# Patient Record
Sex: FEMALE | Race: WHITE | NOT HISPANIC OR LATINO | Employment: OTHER | URBAN - METROPOLITAN AREA
[De-identification: names, ages, dates, MRNs, and addresses within clinical notes are randomized per-mention and may not be internally consistent; named-entity substitution may affect disease eponyms.]

---

## 2017-05-01 ENCOUNTER — HOSPITAL ENCOUNTER (OUTPATIENT)
Dept: RADIOLOGY | Facility: MEDICAL CENTER | Age: 72
End: 2017-05-01
Attending: INTERNAL MEDICINE
Payer: MEDICARE

## 2017-05-01 ENCOUNTER — HOSPITAL ENCOUNTER (OUTPATIENT)
Dept: RADIOLOGY | Facility: MEDICAL CENTER | Age: 72
End: 2017-05-01
Attending: NURSE PRACTITIONER
Payer: MEDICARE

## 2017-05-01 ENCOUNTER — HOSPITAL ENCOUNTER (OUTPATIENT)
Dept: CARDIOLOGY | Facility: MEDICAL CENTER | Age: 72
End: 2017-05-01
Attending: INTERNAL MEDICINE | Admitting: RADIOLOGY
Payer: MEDICARE

## 2017-05-01 DIAGNOSIS — R01.1 MURMUR: ICD-10-CM

## 2017-05-01 DIAGNOSIS — R07.9 CHEST PAIN, UNSPECIFIED: ICD-10-CM

## 2017-05-01 DIAGNOSIS — R94.31 NONSPECIFIC ABNORMAL ELECTROCARDIOGRAM (ECG) (EKG): ICD-10-CM

## 2017-05-01 LAB
LV EJECT FRACT  99904: 65
LV EJECT FRACT MOD 2C 99903: 58.92
LV EJECT FRACT MOD 4C 99902: 65.89
LV EJECT FRACT MOD BP 99901: 67.82

## 2017-05-01 PROCEDURE — 700111 HCHG RX REV CODE 636 W/ 250 OVERRIDE (IP)

## 2017-05-01 PROCEDURE — A9502 TC99M TETROFOSMIN: HCPCS

## 2017-05-01 PROCEDURE — 93306 TTE W/DOPPLER COMPLETE: CPT

## 2017-05-01 PROCEDURE — 93306 TTE W/DOPPLER COMPLETE: CPT | Mod: 26 | Performed by: INTERNAL MEDICINE

## 2017-05-01 RX ORDER — REGADENOSON 0.08 MG/ML
INJECTION, SOLUTION INTRAVENOUS
Status: COMPLETED
Start: 2017-05-01 | End: 2017-05-01

## 2017-05-01 RX ADMIN — REGADENOSON 0.4 MG: 0.08 INJECTION, SOLUTION INTRAVENOUS at 14:04

## 2017-05-01 NOTE — PROGRESS NOTES
Nursing care plan includes knowledge deficit, potential for discomfort, potential for compromised cardiac output. POC includes teaching, comfort measures and reassurance, and access to code cart, cardiology stand by and availability of rapid response team. Pt verbalizes good understanding of benefits and risks of pharmacological cardiac stress test. Informed consent obtained. Lexiscan given, pt developed the following symptoms heavy limbs. VS stable, major symptoms resolved. To waiting room, caffeinated fluids and/or snacks given, awaiting second scan. Nursing goals met.

## 2019-06-25 ENCOUNTER — OFFICE VISIT (OUTPATIENT)
Dept: URGENT CARE | Facility: CLINIC | Age: 74
End: 2019-06-25
Payer: MEDICARE

## 2019-06-25 VITALS
RESPIRATION RATE: 12 BRPM | TEMPERATURE: 100.2 F | HEIGHT: 59 IN | WEIGHT: 129 LBS | OXYGEN SATURATION: 96 % | HEART RATE: 78 BPM | BODY MASS INDEX: 26 KG/M2 | DIASTOLIC BLOOD PRESSURE: 54 MMHG | SYSTOLIC BLOOD PRESSURE: 98 MMHG

## 2019-06-25 DIAGNOSIS — J22 LRTI (LOWER RESPIRATORY TRACT INFECTION): ICD-10-CM

## 2019-06-25 DIAGNOSIS — R05.9 COUGH: ICD-10-CM

## 2019-06-25 PROCEDURE — 99203 OFFICE O/P NEW LOW 30 MIN: CPT | Performed by: NURSE PRACTITIONER

## 2019-06-25 RX ORDER — DOXYCYCLINE HYCLATE 100 MG/1
100 CAPSULE ORAL 2 TIMES DAILY
Qty: 20 CAP | Refills: 0 | Status: SHIPPED | OUTPATIENT
Start: 2019-06-25 | End: 2019-07-05

## 2019-06-25 RX ORDER — MULTIVIT WITH MINERALS/LUTEIN
1 TABLET ORAL DAILY
COMMUNITY

## 2019-06-25 RX ORDER — ROSUVASTATIN CALCIUM 5 MG/1
5 TABLET, COATED ORAL EVERY EVENING
COMMUNITY

## 2019-06-25 ASSESSMENT — ENCOUNTER SYMPTOMS
CHILLS: 0
SORE THROAT: 0
FEVER: 1
SWEATS: 0
HEARTBURN: 0
HEADACHES: 0
WHEEZING: 0
HEMOPTYSIS: 0
COUGH: 1
RHINORRHEA: 0
WEIGHT LOSS: 0
MYALGIAS: 0
SHORTNESS OF BREATH: 0

## 2019-06-25 ASSESSMENT — COPD QUESTIONNAIRES: COPD: 0

## 2019-06-25 NOTE — PROGRESS NOTES
"Subjective:      Rocio Shaikh is a 73 y.o. female who presents with Cough (fatigue, chest feels tight x5days)        Reviewed past medical, surgical and family history. Reviewed prescription and OTC medications with patient in electronic health record today.     Allergies   Allergen Reactions   • Percocet [Oxycodone-Acetaminophen] Vomiting         Cough   This is a new problem. Episode onset:  5 days. The problem has been gradually worsening. The problem occurs constantly. The cough is non-productive. Associated symptoms include a fever and nasal congestion (mild). Pertinent negatives include no chest pain, chills, ear congestion, ear pain, headaches, heartburn, hemoptysis, myalgias, postnasal drip, rash, rhinorrhea, sore throat, shortness of breath, sweats, weight loss or wheezing. Nothing aggravates the symptoms. Treatments tried: tylenol, iburprofen.  The treatment provided mild relief. Her past medical history is significant for pneumonia. There is no history of asthma, bronchiectasis, bronchitis, COPD, emphysema or environmental allergies.       Review of Systems   Constitutional: Positive for fever. Negative for chills and weight loss.   HENT: Negative for ear pain, postnasal drip, rhinorrhea and sore throat.    Respiratory: Positive for cough. Negative for hemoptysis, shortness of breath and wheezing.    Cardiovascular: Negative for chest pain.   Gastrointestinal: Negative for heartburn.   Musculoskeletal: Negative for myalgias.   Skin: Negative for rash.   Neurological: Negative for headaches.   Endo/Heme/Allergies: Negative for environmental allergies.          Objective:     BP (!) 98/54   Pulse 78   Temp 37.9 °C (100.2 °F)   Resp 12   Ht 1.499 m (4' 11\")   Wt 58.5 kg (129 lb)   SpO2 96%   BMI 26.05 kg/m²      Physical Exam   Constitutional: She is oriented to person, place, and time. Vital signs are normal. She appears well-developed and well-nourished.  Non-toxic appearance. She does not have a " sickly appearance. She does not appear ill. No distress.   HENT:   Head: Normocephalic.   Right Ear: Hearing, tympanic membrane, external ear and ear canal normal.   Left Ear: Hearing, tympanic membrane, external ear and ear canal normal.   Nose: No mucosal edema or rhinorrhea. Right sinus exhibits no maxillary sinus tenderness and no frontal sinus tenderness. Left sinus exhibits no maxillary sinus tenderness and no frontal sinus tenderness.   Mouth/Throat: Uvula is midline. Posterior oropharyngeal erythema present. No oropharyngeal exudate, posterior oropharyngeal edema or tonsillar abscesses.    wears bilateral hearing aids   Eyes: Pupils are equal, round, and reactive to light.   Neck: Trachea normal, normal range of motion and full passive range of motion without pain. Neck supple.   Cardiovascular: Normal rate and regular rhythm.  PMI is not displaced.    Pulmonary/Chest: Effort normal. No accessory muscle usage. No tachypnea. No respiratory distress. She has no decreased breath sounds. She has no wheezes. She has rhonchi (scattered t/o does not clear with cough). She has no rales.   Congested, deep cough present during exam.    Abdominal: Soft. Normal appearance. There is no tenderness.   Musculoskeletal: Normal range of motion.   Lymphadenopathy:     She has no cervical adenopathy.        Right: No supraclavicular adenopathy present.        Left: No supraclavicular adenopathy present.   Neurological: She is alert and oriented to person, place, and time. She has normal strength. Gait normal.   Skin: Skin is warm and dry. Capillary refill takes less than 2 seconds.   Psychiatric: She has a normal mood and affect. Her speech is normal and behavior is normal.   Nursing note and vitals reviewed.              Assessment/Plan:     1. LRTI (lower respiratory tract infection)  doxycycline (VIBRAMYCIN) 100 MG Cap   2. Cough           OTC anti-tussive medication of choice to help cough. Dosage and directions per  .     OTC Antipyretic of choice (Acetaminophen, Ibuprofen) for fevers greater than or equal to 101.5 degrees.       OTC antihistamine of choice. Follow manufactures dosing and safety guidelines.     Keep well hydrated     FU prn new or worsening symptoms.

## 2019-06-25 NOTE — PATIENT INSTRUCTIONS
Dextromethorphan oral solution and syrup  What is this medicine?  DEXTROMETHORPHAN (dex troe meth OR fan) is used to help relieve cough.  This medicine may be used for other purposes; ask your health care provider or pharmacist if you have questions.  COMMON BRAND NAME(S): Cough Suppressant, ElixSure Cough, ElixSure Cough DM, PediaCare Children's Long Acting Cough, PediaCare Long-Acting Cough, Robitussin, Robitussin Children's Cough, Robitussin Cough, Robitussin Lingering Cold Long-Acting Cough, Robitussin Pediatric Cough, Scot-Tussin CF, Silphen DM, Triaminic Long Acting Cough, Tylenol Children's Simply Cough, Vicks DayQuil Cough, Vicks DayQuil Nature Fusion, Vicks Formula 44, Vicks Nature Fusion Cough, Zicam Concentrated Cough, Zicam Cough Max, Zicam Cough Nite  What should I tell my health care provider before I take this medicine?  They need to know if you have any of these conditions:  -asthma  -emphysema  -large amount of mucus  -liver disease  -smoker  -an unusual or allergic reaction to dextromethorphan, other medicines, bromides, foods, dyes, or preservatives  -pregnant or trying to get pregnant  -breast-feeding  How should I use this medicine?  Take this medicine by mouth. Follow the directions on the prescription label. Use a specially marked spoon or container to measure each dose. Ask your pharmacist if you do not have one. Household spoons are not accurate. Take your medicine at regular intervals. Do not take it more often than directed.  Talk to your pediatrician regarding the use of this medicine in children. While this drug may be prescribed for children as young as 2 years old for selected conditions, precautions do apply.  Overdosage: If you think you have taken too much of this medicine contact a poison control center or emergency room at once.  NOTE: This medicine is only for you. Do not share this medicine with others.  What if I miss a dose?  If you miss a dose, take it as soon as you can. If  it is almost time for your next dose, take only that dose. Do not take double or extra doses.  What may interact with this medicine?  Do not take this medicine with any of the following medications:  -MAOIs like Carbex, Eldepryl, Marplan, Nardil, and Parnate  This medicine may also interact with the following medications:  -medicines for depression, anxiety, or psychotic disturbances  -other medicines for allergies or cold  -procarbazine  This list may not describe all possible interactions. Give your health care provider a list of all the medicines, herbs, non-prescription drugs, or dietary supplements you use. Also tell them if you smoke, drink alcohol, or use illegal drugs. Some items may interact with your medicine.  What should I watch for while using this medicine?  Do not treat yourself for a cough for more than 1 week without consulting your doctor or health care professional. If you have a high fever, skin rash, lasting headache, or sore throat, see your doctor.  You may get drowsy or dizzy. Do not drive, use machinery, or do anything that needs mental alertness until you know how this medicine affects you. Do not stand or sit up quickly, especially if you are an older patient. This reduces the risk of dizzy or fainting spells. Alcohol may interfere with the effect of this medicine. Avoid alcoholic drinks.  What side effects may I notice from receiving this medicine?  Side effects that you should report to your doctor or health care professional as soon as possible:  -allergic reactions like skin rash, itching or hives, swelling of the face, lips, or tongue  -breathing problems  -confusion  -excitement, nervousness, restlessness, or irritability  -seizure  -slurred speech  Side effects that usually do not require medical attention (report to your doctor or health care professional if they continue or are bothersome):  -headache  -stomach upset  -tiredness  This list may not describe all possible side  effects. Call your doctor for medical advice about side effects. You may report side effects to FDA at 7-757-UAM-8047.  Where should I keep my medicine?  Keep out of the reach of children.  Store at room temperature between 20 and 25 degrees C (68 and 77 degrees F) unless otherwise directed. Protect from light. Do not freeze. Throw away any unused medicine after the expiration date.  NOTE: This sheet is a summary. It may not cover all possible information. If you have questions about this medicine, talk to your doctor, pharmacist, or health care provider.  © 2018 Elsevier/Gold Standard (2009-04-17 15:44:59)      Cough, Adult  Coughing is a reflex that clears your throat and your airways. Coughing helps to heal and protect your lungs. It is normal to cough occasionally, but a cough that happens with other symptoms or lasts a long time may be a sign of a condition that needs treatment. A cough may last only 2-3 weeks (acute), or it may last longer than 8 weeks (chronic).  What are the causes?  Coughing is commonly caused by:  · Breathing in substances that irritate your lungs.  · A viral or bacterial respiratory infection.  · Allergies.  · Asthma.  · Postnasal drip.  · Smoking.  · Acid backing up from the stomach into the esophagus (gastroesophageal reflux).  · Certain medicines.  · Chronic lung problems, including COPD (or rarely, lung cancer).  · Other medical conditions such as heart failure.  Follow these instructions at home:  Pay attention to any changes in your symptoms. Take these actions to help with your discomfort:  · Take medicines only as told by your health care provider.  ¨ If you were prescribed an antibiotic medicine, take it as told by your health care provider. Do not stop taking the antibiotic even if you start to feel better.  ¨ Talk with your health care provider before you take a cough suppressant medicine.  · Drink enough fluid to keep your urine clear or pale yellow.  · If the air is dry, use  a cold steam vaporizer or humidifier in your bedroom or your home to help loosen secretions.  · Avoid anything that causes you to cough at work or at home.  · If your cough is worse at night, try sleeping in a semi-upright position.  · Avoid cigarette smoke. If you smoke, quit smoking. If you need help quitting, ask your health care provider.  · Avoid caffeine.  · Avoid alcohol.  · Rest as needed.  Contact a health care provider if:  · You have new symptoms.  · You cough up pus.  · Your cough does not get better after 2-3 weeks, or your cough gets worse.  · You cannot control your cough with suppressant medicines and you are losing sleep.  · You develop pain that is getting worse or pain that is not controlled with pain medicines.  · You have a fever.  · You have unexplained weight loss.  · You have night sweats.  Get help right away if:  · You cough up blood.  · You have difficulty breathing.  · Your heartbeat is very fast.  This information is not intended to replace advice given to you by your health care provider. Make sure you discuss any questions you have with your health care provider.  Document Released: 06/15/2012 Document Revised: 05/25/2017 Document Reviewed: 02/24/2016  [employer] Interactive Patient Education © 2017 [employer].

## 2020-08-17 ENCOUNTER — HOSPITAL ENCOUNTER (OUTPATIENT)
Facility: MEDICAL CENTER | Age: 75
End: 2020-08-17
Attending: PHYSICIAN ASSISTANT
Payer: MEDICARE

## 2020-08-17 ENCOUNTER — OFFICE VISIT (OUTPATIENT)
Dept: URGENT CARE | Facility: CLINIC | Age: 75
End: 2020-08-17
Payer: MEDICARE

## 2020-08-17 VITALS
RESPIRATION RATE: 16 BRPM | SYSTOLIC BLOOD PRESSURE: 126 MMHG | WEIGHT: 129 LBS | TEMPERATURE: 98.1 F | HEART RATE: 61 BPM | OXYGEN SATURATION: 96 % | HEIGHT: 59 IN | DIASTOLIC BLOOD PRESSURE: 68 MMHG | BODY MASS INDEX: 26 KG/M2

## 2020-08-17 DIAGNOSIS — L03.012 PARONYCHIA OF FINGER OF LEFT HAND: ICD-10-CM

## 2020-08-17 PROCEDURE — 87077 CULTURE AEROBIC IDENTIFY: CPT

## 2020-08-17 PROCEDURE — 87070 CULTURE OTHR SPECIMN AEROBIC: CPT

## 2020-08-17 PROCEDURE — 87205 SMEAR GRAM STAIN: CPT

## 2020-08-17 PROCEDURE — 10060 I&D ABSCESS SIMPLE/SINGLE: CPT | Performed by: PHYSICIAN ASSISTANT

## 2020-08-17 RX ORDER — THYROID 30 MG/1
30 TABLET ORAL DAILY
COMMUNITY

## 2020-08-17 RX ORDER — AMOXICILLIN AND CLAVULANATE POTASSIUM 875; 125 MG/1; MG/1
1 TABLET, FILM COATED ORAL 2 TIMES DAILY
Qty: 14 TAB | Refills: 0 | Status: SHIPPED | OUTPATIENT
Start: 2020-08-17 | End: 2020-08-24

## 2020-08-17 RX ORDER — VITS A,C,E/LUTEIN/MINERALS 300MCG-200
1 TABLET ORAL DAILY
COMMUNITY

## 2020-08-17 RX ORDER — MAGNESIUM GLUCONATE 27 MG(500)
500 TABLET ORAL 3 TIMES DAILY
COMMUNITY

## 2020-08-17 RX ORDER — ANTIARTHRITIC COMBINATION NO.2 900 MG
25 TABLET ORAL DAILY
COMMUNITY

## 2020-08-17 RX ORDER — CHLORAL HYDRATE 500 MG
1000 CAPSULE ORAL
COMMUNITY

## 2020-08-17 ASSESSMENT — ENCOUNTER SYMPTOMS
FEVER: 0
FOCAL WEAKNESS: 0
NAUSEA: 0
CHILLS: 0
VOMITING: 0
TINGLING: 0
SENSORY CHANGE: 0

## 2020-08-17 NOTE — PATIENT INSTRUCTIONS
Paronychia  Paronychia is an infection of the skin. It happens near a fingernail or toenail. It may cause pain and swelling around the nail. In some cases, a fluid-filled bump (abscess) can form near or under the nail.  Usually, this condition is not serious, and it clears up with treatment.  Follow these instructions at home:  Wound care  · Keep the affected area clean.  · Soak the fingers or toes in warm water as told by your doctor. You may be told to do this for 20 minutes, 2-3 times a day.  · Keep the area dry when you are not soaking it.  · Do not try to drain a fluid-filled bump on your own.  · Follow instructions from your doctor about how to take care of the affected area. Make sure you:  ? Wash your hands with soap and water before you change your bandage (dressing). If you cannot use soap and water, use hand .  ? Change your bandage as told by your doctor.  · If you had a fluid-filled bump and your doctor drained it, check the area every day for signs of infection. Check for:  ? Redness, swelling, or pain.  ? Fluid or blood.  ? Warmth.  ? Pus or a bad smell.  Medicines    · Take over-the-counter and prescription medicines only as told by your doctor.  · If you were prescribed an antibiotic medicine, take it as told by your doctor. Do not stop taking it even if you start to feel better.  General instructions  · Avoid touching any chemicals.  · Do not pick at the affected area.  Prevention  · To prevent this condition from happening again:  ? Wear rubber gloves when putting your hands in water for washing dishes or other tasks.  ? Wear gloves if your hands might touch  or chemicals.  ? Avoid injuring your nails or fingertips.  ? Do not bite your nails or tear hangnails.  ? Do not cut your nails very short.  ? Do not cut the skin at the base and sides of the nail (cuticles).  ? Use clean nail clippers or scissors when trimming nails.  Contact a doctor if:  · You feel worse.  · You do not get  better.  · You have more fluid, blood, or pus coming from the affected area.  · Your finger or knuckle is swollen or is hard to move.  Get help right away if you have:  · A fever or chills.  · Redness spreading from the affected area.  · Pain in a joint or muscle.  Summary  · Paronychia is an infection of the skin. It happens near a fingernail or toenail.  · This condition may cause pain and swelling around the nail.  · Soak the fingers or toes in warm water as told by your doctor.  · Usually, this condition is not serious, and it clears up with treatment.  This information is not intended to replace advice given to you by your health care provider. Make sure you discuss any questions you have with your health care provider.  Document Released: 12/06/2010 Document Revised: 01/04/2019 Document Reviewed: 12/31/2018  Elsevier Patient Education © 2020 Else[employer] Inc.

## 2020-08-17 NOTE — PROGRESS NOTES
"Subjective:      Rocio Shaikh is a 74 y.o. female who presents with Wound Infection (infection in the side of the left middle finger, there's a pus or possible tiny foreign obeject inside started 3 days ago, hurting, swelling and warm, very tender.)            The patient is here today with complaints of left middle finger pain and swelling. She states she noticed this about 4-5 days ago. She denies any injury. No active drainage. The patient does bite her nails. No fever or chills. She has done nothing for her symptoms. No alleviating or aggravating factors.     No past medical history on file.    No past surgical history on file.    No family history on file.    Allergies   Allergen Reactions   • Percocet [Oxycodone-Acetaminophen] Vomiting       Medications, Allergies, and current problem list reviewed today in Epic      Review of Systems   Constitutional: Negative for chills, fever and malaise/fatigue.   Gastrointestinal: Negative for nausea and vomiting.   Musculoskeletal: Negative for joint pain.   Skin:        Swelling, tenderness around nail left middle finger    Neurological: Negative for tingling, sensory change and focal weakness.     All other systems reviewed and are negative.        Objective:     /68   Pulse 61   Temp 36.7 °C (98.1 °F)   Resp 16   Ht 1.499 m (4' 11\")   Wt 58.5 kg (129 lb)   SpO2 96%   BMI 26.05 kg/m²      Physical Exam  Constitutional:       General: She is not in acute distress.  HENT:      Head: Normocephalic and atraumatic.   Cardiovascular:      Rate and Rhythm: Normal rate.   Pulmonary:      Effort: Pulmonary effort is normal. No respiratory distress.   Musculoskeletal: Normal range of motion.        Hands:    Skin:     General: Skin is warm and dry.   Neurological:      General: No focal deficit present.      Mental Status: She is alert and oriented to person, place, and time.   Psychiatric:         Mood and Affect: Mood normal.         Behavior: Behavior normal.   "       Thought Content: Thought content normal.         Judgment: Judgment normal.            Procedure: Incision and Drainage  -Risks, benefits, and alternatives discussed. Risks including infection, bleeding, nerve damage, and poor cosmetic outcome  -Sterile technique throughout  -Local anesthesia with 2% lidocaine -Incision with #11 blade into fluctuant area with purulent material expressed  -Culture obtained and packaged for lab  -Irrigated copiously with NS  -Minimal bleeding with good hemostasis achieved  -The patient tolerated the procedure well         Assessment/Plan:        1. Paronychia of finger of left hand    I&D  Wound care discussed.   -  AVS printed  And given to patient with home care instructions and red flags and warning signs that warrant ER evaluation or immediate follow-up.    - amoxicillin-clavulanate (AUGMENTIN) 875-125 MG Tab; Take 1 Tab by mouth 2 times a day for 7 days.  Dispense: 14 Tab; Refill: 0  - CULTURE WOUND W/ GRAM STAIN; Future      Differential diagnoses, Supportive care, and indications for immediate follow-up discussed with patient.   Pathogenesis of diagnosis discussed including typical length and natural progression.   Instructed to return to clinic or nearest emergency department for any change in condition, further concerns, or worsening of symptoms.    The patient demonstrated a good understanding and agreed with the treatment plan.    Constanza Sr P.A.-C.

## 2020-08-18 LAB
GRAM STN SPEC: NORMAL
SIGNIFICANT IND 70042: NORMAL
SITE SITE: NORMAL
SOURCE SOURCE: NORMAL

## 2020-08-20 LAB
BACTERIA WND AEROBE CULT: ABNORMAL
BACTERIA WND AEROBE CULT: ABNORMAL
GRAM STN SPEC: ABNORMAL
SIGNIFICANT IND 70042: ABNORMAL
SITE SITE: ABNORMAL
SOURCE SOURCE: ABNORMAL

## 2021-04-02 ENCOUNTER — OFFICE VISIT (OUTPATIENT)
Dept: URGENT CARE | Facility: CLINIC | Age: 76
End: 2021-04-02
Payer: MEDICARE

## 2021-04-02 VITALS
BODY MASS INDEX: 26 KG/M2 | DIASTOLIC BLOOD PRESSURE: 60 MMHG | OXYGEN SATURATION: 97 % | RESPIRATION RATE: 12 BRPM | HEART RATE: 59 BPM | SYSTOLIC BLOOD PRESSURE: 118 MMHG | WEIGHT: 129 LBS | TEMPERATURE: 97.1 F | HEIGHT: 59 IN

## 2021-04-02 DIAGNOSIS — B02.9 HERPES ZOSTER WITHOUT COMPLICATION: ICD-10-CM

## 2021-04-02 DIAGNOSIS — R21 RASH, SKIN: ICD-10-CM

## 2021-04-02 PROCEDURE — 99213 OFFICE O/P EST LOW 20 MIN: CPT | Performed by: NURSE PRACTITIONER

## 2021-04-02 RX ORDER — VALACYCLOVIR HYDROCHLORIDE 1 G/1
1000 TABLET, FILM COATED ORAL 3 TIMES DAILY
Qty: 21 TABLET | Refills: 0 | Status: SHIPPED | OUTPATIENT
Start: 2021-04-02 | End: 2021-04-09

## 2021-04-02 ASSESSMENT — ENCOUNTER SYMPTOMS
SENSORY CHANGE: 0
ABDOMINAL PAIN: 0
VOMITING: 0
DIARRHEA: 0
CONSTIPATION: 0
FEVER: 0
PALPITATIONS: 0
SHORTNESS OF BREATH: 0
CHILLS: 0
ORTHOPNEA: 0
DIZZINESS: 0
MYALGIAS: 0
WEAKNESS: 0
TINGLING: 0
WHEEZING: 0
HEADACHES: 0
NAUSEA: 0

## 2021-04-02 NOTE — PROGRESS NOTES
Subjective:      Rocio Shaikh is a 75 y.o. female who presents with Herpes Zoster (right arm with blistering)            HPI  States has had rash on right upper arm x 2-3 days. States saw a blister at site, urticaria. States more lesions have formed. No over the counter meds used.     PMH:  has no past medical history on file.  MEDS:   Current Outpatient Medications:   •  valacyclovir (VALTREX) 1 GM Tab, Take 1 tablet by mouth 3 times a day for 7 days., Disp: 21 tablet, Rfl: 0  •  thyroid (ARMOUR THYROID) 30 MG Tab, Take 30 mg by mouth every day., Disp: , Rfl:   •  Omega-3 Fatty Acids (FISH OIL) 1000 MG Cap capsule, Take 1,000 mg by mouth 3 times a day, with meals., Disp: , Rfl:   •  DHEA 25 MG Tab, Take 25 mg by mouth every day., Disp: , Rfl:   •  magnesium gluconate (MAG-G) 500 MG tablet, Take 500 mg by mouth 3 times a day., Disp: , Rfl:   •  Multiple Vitamins-Minerals (OCUVITE-LUTEIN) Tab, Take 1 tablet by mouth every day., Disp: , Rfl:   •  rosuvastatin (CRESTOR) 5 MG Tab, Take 5 mg by mouth every evening., Disp: , Rfl:   •  Multiple Vitamins-Minerals (MULTIVITAMIN ADULT PO), Take  by mouth., Disp: , Rfl:   •  vitamin E (VITAMIN E) 1000 UNIT Cap, Take 1 Cap by mouth every day., Disp: , Rfl:   •  vitamin D (CHOLECALCIFEROL) 1000 UNIT Tab, Take 1,000 Units by mouth every day., Disp: , Rfl:   •  VITAMIN K PO, Take  by mouth., Disp: , Rfl:   ALLERGIES:   Allergies   Allergen Reactions   • Percocet [Oxycodone-Acetaminophen] Vomiting     SURGHX: No past surgical history on file.  SOCHX:  reports that she has never smoked. She has never used smokeless tobacco. She reports current alcohol use of about 0.6 oz of alcohol per week. She reports that she does not use drugs.  FH: Family history was reviewed, no pertinent findings to report    Review of Systems   Constitutional: Negative for chills, fever and malaise/fatigue.   Respiratory: Negative for shortness of breath and wheezing.    Cardiovascular: Negative for chest  "pain, palpitations and orthopnea.   Gastrointestinal: Negative for abdominal pain, constipation, diarrhea, nausea and vomiting.   Musculoskeletal: Negative for myalgias.   Skin: Positive for itching and rash.        Rash to right upper arm.   Neurological: Negative for dizziness, tingling, sensory change, weakness and headaches.   Endo/Heme/Allergies: Negative for environmental allergies.   All other systems reviewed and are negative.         Objective:     /60   Pulse (!) 59   Temp 36.2 °C (97.1 °F) (Temporal)   Resp 12   Ht 1.499 m (4' 11\")   Wt 58.5 kg (129 lb)   SpO2 97%   BMI 26.05 kg/m²      Physical Exam  Vitals reviewed.   Constitutional:       General: She is awake. She is not in acute distress.     Appearance: Normal appearance. She is well-developed. She is not ill-appearing, toxic-appearing or diaphoretic.   HENT:      Head: Normocephalic.   Eyes:      Conjunctiva/sclera: Conjunctivae normal.      Pupils: Pupils are equal, round, and reactive to light.   Cardiovascular:      Rate and Rhythm: Normal rate.   Pulmonary:      Effort: Pulmonary effort is normal.   Musculoskeletal:         General: Normal range of motion.      Cervical back: Normal range of motion and neck supple.   Skin:     General: Skin is warm and dry.      Findings: Erythema and rash present. Rash is macular and urticarial. Rash is not crusting or vesicular.      Comments: Three small macular lesions on right ventral aspect of upper extremity.    Neurological:      Mental Status: She is alert and oriented to person, place, and time.   Psychiatric:         Behavior: Behavior is cooperative.                 Assessment/Plan:        1. Rash, skin      2. Herpes zoster without complication    - valacyclovir (VALTREX) 1 GM Tab; Take 1 tablet by mouth 3 times a day for 7 days.  Dispense: 21 tablet; Refill: 0    May clean area with mild soap, do not scrub, wash with tepid water, tap dry  May use over the counter hydrocortisone cream " 1% twice daily x 7 days as needed for rash  Do not apply cream to to open blisters  May use NSAID for any pain/discomfort  Monitor for increase in rash size or areas affected on face, increased pain, skin infection- re-evaluate

## 2021-10-13 ENCOUNTER — OFFICE VISIT (OUTPATIENT)
Dept: URGENT CARE | Facility: CLINIC | Age: 76
End: 2021-10-13
Payer: MEDICARE

## 2021-10-13 VITALS
WEIGHT: 128 LBS | DIASTOLIC BLOOD PRESSURE: 84 MMHG | HEART RATE: 60 BPM | BODY MASS INDEX: 25.8 KG/M2 | HEIGHT: 59 IN | SYSTOLIC BLOOD PRESSURE: 122 MMHG | OXYGEN SATURATION: 97 % | TEMPERATURE: 97 F

## 2021-10-13 DIAGNOSIS — H61.21 IMPACTED CERUMEN OF RIGHT EAR: ICD-10-CM

## 2021-10-13 PROCEDURE — 99212 OFFICE O/P EST SF 10 MIN: CPT | Performed by: PHYSICIAN ASSISTANT

## 2021-10-13 ASSESSMENT — ENCOUNTER SYMPTOMS
HEADACHES: 0
CHILLS: 0
SORE THROAT: 0
FEVER: 0
DIZZINESS: 0

## 2021-10-13 NOTE — PROGRESS NOTES
Subjective     Rocio Shaikh is a 76 y.o. female who presents with Cerumen Impaction (x 1 wk)    HPI:  Rocio Shaikh is a 76 y.o. female who presents today for evaluation of cerumen impaction.  Patient reports that she was having a lot of feedback in her hearing aids, mostly on the right side.  Approximate 1 week ago she went to have her hearing aids serviced and they informed her that she had quite a bit of earwax in the right ear and were unable to do anything until she got removed.  Patient has been putting softening drops in her right ear for the past week and is now here for evaluation and hopefully earwax removal.  She has no URI symptoms.  No ear pain, discharge from the ear, fever/chills, or lightheadedness/dizziness.        Review of Systems   Constitutional: Negative for chills and fever.   HENT: Positive for hearing loss. Negative for congestion, ear discharge, ear pain and sore throat.    Neurological: Negative for dizziness and headaches.         PMH:  has no past medical history on file.  MEDS:   Current Outpatient Medications:   •  thyroid (ARMOUR THYROID) 30 MG Tab, Take 30 mg by mouth every day., Disp: , Rfl:   •  Omega-3 Fatty Acids (FISH OIL) 1000 MG Cap capsule, Take 1,000 mg by mouth 3 times a day, with meals., Disp: , Rfl:   •  DHEA 25 MG Tab, Take 25 mg by mouth every day., Disp: , Rfl:   •  magnesium gluconate (MAG-G) 500 MG tablet, Take 500 mg by mouth 3 times a day., Disp: , Rfl:   •  Multiple Vitamins-Minerals (OCUVITE-LUTEIN) Tab, Take 1 tablet by mouth every day., Disp: , Rfl:   •  rosuvastatin (CRESTOR) 5 MG Tab, Take 5 mg by mouth every evening., Disp: , Rfl:   •  Multiple Vitamins-Minerals (MULTIVITAMIN ADULT PO), Take  by mouth., Disp: , Rfl:   •  vitamin E (VITAMIN E) 1000 UNIT Cap, Take 1 Cap by mouth every day., Disp: , Rfl:   •  vitamin D (CHOLECALCIFEROL) 1000 UNIT Tab, Take 1,000 Units by mouth every day., Disp: , Rfl:   •  VITAMIN K PO, Take  by mouth., Disp: , Rfl:  "  ALLERGIES:   Allergies   Allergen Reactions   • Percocet [Oxycodone-Acetaminophen] Vomiting     SURGHX: No past surgical history on file.  SOCHX:  reports that she has never smoked. She has never used smokeless tobacco. She reports current alcohol use of about 0.6 oz of alcohol per week. She reports that she does not use drugs.  FH: Family history was reviewed, no pertinent findings to report      Objective     /84 (BP Location: Right arm, Patient Position: Sitting)   Pulse 60   Temp 36.1 °C (97 °F) (Temporal)   Ht 1.499 m (4' 11\")   Wt 58.1 kg (128 lb)   SpO2 97%   BMI 25.85 kg/m²      Physical Exam  Constitutional:       Appearance: She is well-developed.   HENT:      Head: Normocephalic and atraumatic.      Right Ear: External ear normal. There is impacted cerumen.      Left Ear: Tympanic membrane, ear canal and external ear normal.   Eyes:      Conjunctiva/sclera: Conjunctivae normal.      Pupils: Pupils are equal, round, and reactive to light.   Cardiovascular:      Rate and Rhythm: Normal rate and regular rhythm.      Heart sounds: Normal heart sounds. No murmur heard.     Pulmonary:      Effort: Pulmonary effort is normal.      Breath sounds: Normal breath sounds. No wheezing.   Musculoskeletal:      Cervical back: Normal range of motion.   Lymphadenopathy:      Cervical: No cervical adenopathy.   Skin:     General: Skin is warm and dry.      Capillary Refill: Capillary refill takes less than 2 seconds.   Neurological:      Mental Status: She is alert and oriented to person, place, and time.   Psychiatric:         Behavior: Behavior normal.         Judgment: Judgment normal.       CERUMEN REMOVAL  Cerumen removal completed by MA using irrigation technique.  Examination of the RIGHT ear s/p cerumen removal revealsTM intact without significant erythema. External canal sustains mild erythema, no active bleeding present.          Assessment & Plan     1. Impacted cerumen of right ear  - Follow-up as " needed            Differential Diagnosis, natural history, and supportive care discussed. Return to the Urgent Care or follow up with your PCP if symptoms fail to resolve, or for any new or worsening symptoms. Emergency room precautions discussed. Patient and/or family appears understanding of information.

## 2021-10-16 ENCOUNTER — OFFICE VISIT (OUTPATIENT)
Dept: URGENT CARE | Facility: CLINIC | Age: 76
End: 2021-10-16
Payer: MEDICARE

## 2021-10-16 VITALS
OXYGEN SATURATION: 95 % | SYSTOLIC BLOOD PRESSURE: 128 MMHG | DIASTOLIC BLOOD PRESSURE: 66 MMHG | BODY MASS INDEX: 26.51 KG/M2 | HEIGHT: 59 IN | HEART RATE: 66 BPM | TEMPERATURE: 99.6 F | WEIGHT: 131.5 LBS | RESPIRATION RATE: 16 BRPM

## 2021-10-16 DIAGNOSIS — L03.114 CELLULITIS OF LEFT UPPER EXTREMITY: ICD-10-CM

## 2021-10-16 DIAGNOSIS — S60.512A ABRASION OF LEFT HAND, INITIAL ENCOUNTER: ICD-10-CM

## 2021-10-16 PROCEDURE — 90471 IMMUNIZATION ADMIN: CPT | Performed by: PHYSICIAN ASSISTANT

## 2021-10-16 PROCEDURE — 90714 TD VACC NO PRESV 7 YRS+ IM: CPT | Performed by: PHYSICIAN ASSISTANT

## 2021-10-16 PROCEDURE — 99213 OFFICE O/P EST LOW 20 MIN: CPT | Mod: 25 | Performed by: PHYSICIAN ASSISTANT

## 2021-10-16 RX ORDER — DOXYCYCLINE HYCLATE 100 MG
100 TABLET ORAL 2 TIMES DAILY
Qty: 14 TABLET | Refills: 0 | Status: SHIPPED | OUTPATIENT
Start: 2021-10-16 | End: 2021-10-23

## 2021-10-16 ASSESSMENT — ENCOUNTER SYMPTOMS
CHILLS: 0
FOCAL WEAKNESS: 0
NAUSEA: 0
VOMITING: 0
FEVER: 0
TINGLING: 0
SENSORY CHANGE: 0

## 2021-10-16 NOTE — PROGRESS NOTES
"Subjective     Rocio Shaikh is a 76 y.o. female who presents with Bump ((L) bump on base of  thumb; no pain, no itchiness )            The patient is here with concerns of left thumb swelling and redness. She states she noticed an abrasion last night after working in the yard. This morning she woke up with moderate swelling and redness. She denies fever or chills. She has no FB sensation. Td is not UTD. She has no pain or itching.    No past medical history on file.    No past surgical history on file.    No family history on file.    Allergies   Allergen Reactions   • Percocet [Oxycodone-Acetaminophen] Vomiting       Medications, Allergies, and current problem list reviewed today in Epic      Review of Systems   Constitutional: Negative for chills and fever.   Gastrointestinal: Negative for nausea and vomiting.   Musculoskeletal: Negative for joint pain.   Skin:        Abrasion to left thumb      Neurological: Negative for tingling, sensory change and focal weakness.     All other systems reviewed and are negative.            Objective     /66 (BP Location: Left arm, Patient Position: Sitting)   Pulse 66   Temp 37.6 °C (99.6 °F) (Temporal)   Resp 16   Ht 1.499 m (4' 11\")   Wt 59.6 kg (131 lb 8 oz)   SpO2 95%   BMI 26.56 kg/m²      Physical Exam  Constitutional:       General: She is not in acute distress.     Appearance: She is not ill-appearing.   HENT:      Head: Normocephalic and atraumatic.   Eyes:      Conjunctiva/sclera: Conjunctivae normal.   Cardiovascular:      Rate and Rhythm: Normal rate.   Pulmonary:      Effort: Pulmonary effort is normal. No respiratory distress.   Musculoskeletal:        Hands:    Neurological:      General: No focal deficit present.      Mental Status: She is alert and oriented to person, place, and time.   Psychiatric:         Mood and Affect: Mood normal.         Behavior: Behavior normal.         Thought Content: Thought content normal.         Judgment: Judgment " normal.                             Assessment & Plan        1. Abrasion of left hand, initial encounter    2. Cellulitis of left upper extremity    - doxycycline (VIBRAMYCIN) 100 MG Tab; Take 1 Tablet by mouth 2 times a day for 7 days.  Dispense: 14 Tablet; Refill: 0  - TD Preservative Free =>8yo IM      Differential diagnoses, Supportive care, and indications for immediate follow-up discussed with patient.   Pathogenesis of diagnosis discussed including typical length and natural progression.   Instructed to return to clinic or nearest emergency department for any change in condition, further concerns, or worsening of symptoms.    The patient demonstrated a good understanding and agreed with the treatment plan.    Constanza Sr P.A.-C.

## 2022-06-03 ENCOUNTER — OFFICE VISIT (OUTPATIENT)
Dept: URGENT CARE | Facility: CLINIC | Age: 77
End: 2022-06-03
Payer: MEDICARE

## 2022-06-03 VITALS
SYSTOLIC BLOOD PRESSURE: 112 MMHG | RESPIRATION RATE: 14 BRPM | WEIGHT: 128 LBS | HEART RATE: 71 BPM | DIASTOLIC BLOOD PRESSURE: 62 MMHG | HEIGHT: 59 IN | OXYGEN SATURATION: 94 % | BODY MASS INDEX: 25.8 KG/M2 | TEMPERATURE: 99 F

## 2022-06-03 DIAGNOSIS — S00.411A ABRASION OF RIGHT EAR CANAL, INITIAL ENCOUNTER: ICD-10-CM

## 2022-06-03 DIAGNOSIS — H61.21 IMPACTED CERUMEN OF RIGHT EAR: ICD-10-CM

## 2022-06-03 PROCEDURE — 99213 OFFICE O/P EST LOW 20 MIN: CPT | Performed by: NURSE PRACTITIONER

## 2022-06-03 RX ORDER — OFLOXACIN 3 MG/ML
10 SOLUTION AURICULAR (OTIC) DAILY
Qty: 10 ML | Refills: 0 | Status: SHIPPED | OUTPATIENT
Start: 2022-06-03 | End: 2022-06-08

## 2022-06-03 ASSESSMENT — ENCOUNTER SYMPTOMS: FEVER: 0

## 2022-06-03 NOTE — PROGRESS NOTES
"  Subjective:     Rocio Shaikh is a 76 y.o. female who presents for Cerumen Impaction (Pt requests a clean out)      Wears atiya aids, reports decreased hearing in right ear. Has a hx of cerumen impaction. Has been using OTC tx. Denies ear pain.     Cerumen Impaction  This is a new problem. Pertinent negatives include no fever.       No past medical history on file.    No past surgical history on file.    Social History     Socioeconomic History   • Marital status:      Spouse name: Not on file   • Number of children: Not on file   • Years of education: Not on file   • Highest education level: Not on file   Occupational History   • Not on file   Tobacco Use   • Smoking status: Never Smoker   • Smokeless tobacco: Never Used   Vaping Use   • Vaping Use: Never used   Substance and Sexual Activity   • Alcohol use: Yes     Alcohol/week: 0.6 oz     Types: 1 Glasses of wine per week     Comment: rare   • Drug use: No   • Sexual activity: Not on file   Other Topics Concern   • Not on file   Social History Narrative   • Not on file     Social Determinants of Health     Financial Resource Strain: Not on file   Food Insecurity: Not on file   Transportation Needs: Not on file   Physical Activity: Not on file   Stress: Not on file   Social Connections: Not on file   Intimate Partner Violence: Not on file   Housing Stability: Not on file        No family history on file.     Allergies   Allergen Reactions   • Percocet [Oxycodone-Acetaminophen] Vomiting       Review of Systems   Constitutional: Negative for fever.   HENT: Positive for hearing loss. Negative for ear discharge and ear pain.    All other systems reviewed and are negative.       Objective:   /62 (BP Location: Left arm, Patient Position: Sitting, BP Cuff Size: Adult)   Pulse 71   Temp 37.2 °C (99 °F) (Temporal)   Resp 14   Ht 1.499 m (4' 11\")   Wt 58.1 kg (128 lb)   SpO2 94%   BMI 25.85 kg/m²     Physical Exam  Vitals reviewed.   Constitutional:  "      General: She is not in acute distress.     Appearance: She is toxic-appearing.   HENT:      Right Ear: External ear normal. Swelling present. There is impacted cerumen. Tympanic membrane is not perforated.      Left Ear: Tympanic membrane, ear canal and external ear normal.      Ears:      Comments: Moist white impaction, right ear.     Post irrigation, area of skin breakdown in area of impaction. Mild erythema and swelling. TM intact.  Pulmonary:      Effort: Pulmonary effort is normal. No respiratory distress.   Musculoskeletal:      Cervical back: Neck supple.   Skin:     General: Skin is warm and dry.   Neurological:      General: No focal deficit present.      Mental Status: She is alert and oriented to person, place, and time.   Psychiatric:         Mood and Affect: Mood normal.         Behavior: Behavior normal.         Assessment/Plan:   1. Abrasion of right ear canal, initial encounter  - ofloxacin otic sol (FLOXIN OTIC) 0.3 % Solution; Administer 10 Drops into the right ear every day for 5 days.  Dispense: 10 mL; Refill: 0    2. Impacted cerumen of right ear  -Right ear irrigation.    Follow up for ear pain, ear drainage, fevers, changes in hearing, or any other concerns.    Patient tolerated procedure well. Reports improvement in hearing. Discussed abrasion in canal, and topical antibiotic coverage.     Differential diagnosis, natural history, supportive care, and indications for immediate follow-up discussed.

## 2022-12-20 ENCOUNTER — OFFICE VISIT (OUTPATIENT)
Dept: URGENT CARE | Facility: CLINIC | Age: 77
End: 2022-12-20
Payer: MEDICARE

## 2022-12-20 VITALS
SYSTOLIC BLOOD PRESSURE: 114 MMHG | WEIGHT: 132.4 LBS | RESPIRATION RATE: 16 BRPM | HEIGHT: 59 IN | TEMPERATURE: 97.7 F | OXYGEN SATURATION: 95 % | DIASTOLIC BLOOD PRESSURE: 70 MMHG | BODY MASS INDEX: 26.69 KG/M2 | HEART RATE: 83 BPM

## 2022-12-20 DIAGNOSIS — H61.21 IMPACTED CERUMEN OF RIGHT EAR: ICD-10-CM

## 2022-12-20 PROCEDURE — 99213 OFFICE O/P EST LOW 20 MIN: CPT | Performed by: FAMILY MEDICINE

## 2022-12-20 NOTE — PROGRESS NOTES
"  Subjective:      77 y.o. female presents to urgent care for change in hearing to her right ear.  For the last couple of weeks she has been getting feedback from her right hearing aid.  This typically occurs when she has significant amount of wax.  She has had to get her right ear cleaned out previously, the last time was about 6 months ago.  No other cold symptoms such as cough, sore throat, fever, or diarrhea.    She denies any other questions or concerns at this time.    Current problem list, medication, and past medical/surgical history were reviewed in Epic.    ROS  See HPI     Objective:      /70   Pulse 83   Temp 36.5 °C (97.7 °F) (Temporal)   Resp 16   Ht 1.499 m (4' 11\")   Wt 60.1 kg (132 lb 6.4 oz)   SpO2 95%   BMI 26.74 kg/m²     Physical Exam  Constitutional:       General: She is not in acute distress.     Appearance: She is not diaphoretic.   HENT:      Right Ear: There is impacted cerumen.      Left Ear: Tympanic membrane, ear canal and external ear normal.   Cardiovascular:      Rate and Rhythm: Normal rate and regular rhythm.      Heart sounds: Normal heart sounds.   Pulmonary:      Effort: Pulmonary effort is normal. No respiratory distress.      Breath sounds: Normal breath sounds.   Neurological:      Mental Status: She is alert.   Psychiatric:         Mood and Affect: Affect normal.         Judgment: Judgment normal.     Assessment/Plan:     1. Impacted cerumen of right ear  Ear was irrigated by MA with good return of cerumen.  Normal tympanic membrane upon reinspection.      Instructed to return to Urgent Care or nearest Emergency Department if symptoms fail to improve, for any change in condition, further concerns, or new concerning symptoms. Patient states understanding of the plan of care and discharge instructions.    Melody Chambers M.D.   "

## 2023-09-11 ENCOUNTER — OFFICE VISIT (OUTPATIENT)
Dept: URGENT CARE | Facility: CLINIC | Age: 78
End: 2023-09-11
Payer: MEDICARE

## 2023-09-11 VITALS
OXYGEN SATURATION: 98 % | HEIGHT: 59 IN | BODY MASS INDEX: 26.61 KG/M2 | TEMPERATURE: 98.7 F | DIASTOLIC BLOOD PRESSURE: 60 MMHG | HEART RATE: 84 BPM | SYSTOLIC BLOOD PRESSURE: 118 MMHG | RESPIRATION RATE: 18 BRPM | WEIGHT: 132 LBS

## 2023-09-11 DIAGNOSIS — H61.21 IMPACTED CERUMEN OF RIGHT EAR: ICD-10-CM

## 2023-09-11 PROCEDURE — 3078F DIAST BP <80 MM HG: CPT

## 2023-09-11 PROCEDURE — 99213 OFFICE O/P EST LOW 20 MIN: CPT

## 2023-09-11 PROCEDURE — 3074F SYST BP LT 130 MM HG: CPT

## 2023-09-11 NOTE — PROGRESS NOTES
Subjective:   Rocio Shaikh is a 78 y.o. female who presents for Cerumen Impaction (Right ear wax build up)      HPI:    Patient presents to urgent care with concerns of decreased hearing from the right ear, ear fullness, muffled hearing, popping sensation.   Reports onset was approximately a week ago.  Wears hearing aids. States she went to see her audiologist at [facility] and she was told to have her right ear irrigated,  Has been using Debrox over the past two days to soften wax.  Reports history of cerumen impaction  Denies radiation of pain  No fevers, chills       ROS As above in HPI    Medications:    Current Outpatient Medications on File Prior to Visit   Medication Sig Dispense Refill    thyroid (ARMOUR THYROID) 30 MG Tab Take 30 mg by mouth every day.      Omega-3 Fatty Acids (FISH OIL) 1000 MG Cap capsule Take 1,000 mg by mouth 3 times a day, with meals.      DHEA 25 MG Tab Take 25 mg by mouth every day.      magnesium gluconate (MAG-G) 500 MG tablet Take 500 mg by mouth 3 times a day.      Multiple Vitamins-Minerals (OCUVITE-LUTEIN) Tab Take 1 tablet by mouth every day.      rosuvastatin (CRESTOR) 5 MG Tab Take 5 mg by mouth every evening.      Multiple Vitamins-Minerals (MULTIVITAMIN ADULT PO) Take  by mouth.      vitamin E (VITAMIN E) 1000 UNIT Cap Take 1 Cap by mouth every day.      vitamin D (CHOLECALCIFEROL) 1000 UNIT Tab Take 1,000 Units by mouth every day.      VITAMIN K PO Take  by mouth.      meloxicam (MOBIC) 15 MG tablet Take 1 Tablet by mouth every day. (Patient not taking: Reported on 12/20/2022) 30 Tablet 0     No current facility-administered medications on file prior to visit.        Allergies:   Percocet [oxycodone-acetaminophen]    Problem List:   There is no problem list on file for this patient.       Surgical History:  No past surgical history on file.    Past Social Hx:   Social History     Tobacco Use    Smoking status: Never    Smokeless tobacco: Never   Vaping Use    Vaping Use:  "Never used   Substance Use Topics    Alcohol use: Yes     Alcohol/week: 0.6 oz     Types: 1 Glasses of wine per week     Comment: rare    Drug use: No          Problem list, medications, and allergies reviewed by myself today in Epic.     Objective:     /60   Pulse 84   Temp 37.1 °C (98.7 °F) (Temporal)   Resp 18   Ht 1.499 m (4' 11\")   Wt 59.9 kg (132 lb)   SpO2 98%   BMI 26.66 kg/m²     Physical Exam  Vitals and nursing note reviewed.   Constitutional:       General: She is not in acute distress.     Appearance: Normal appearance. She is not ill-appearing.   HENT:      Head: Normocephalic.      Right Ear: Ear canal normal. No swelling or tenderness. No middle ear effusion. There is impacted cerumen. Tympanic membrane is not erythematous or bulging.      Left Ear: Tympanic membrane and ear canal normal. No swelling or tenderness.  No middle ear effusion. Tympanic membrane is not injected, erythematous or bulging.      Nose: Nose normal. No congestion or rhinorrhea.      Right Sinus: No maxillary sinus tenderness or frontal sinus tenderness.      Left Sinus: No maxillary sinus tenderness or frontal sinus tenderness.      Mouth/Throat:      Mouth: Mucous membranes are moist.      Pharynx: Oropharynx is clear. Uvula midline. No pharyngeal swelling, oropharyngeal exudate or posterior oropharyngeal erythema.   Cardiovascular:      Rate and Rhythm: Normal rate and regular rhythm.      Heart sounds: Normal heart sounds.   Pulmonary:      Effort: Pulmonary effort is normal.      Breath sounds: Normal breath sounds.   Skin:     General: Skin is warm and dry.      Findings: No rash.   Neurological:      Mental Status: She is alert and oriented to person, place, and time.         Assessment/Plan:     Diagnosis and associated orders:   1. Impacted cerumen of right ear  - Ear Cerumen Removal        Comments/MDM:     Ear lavage:   Right ear canal irrigated with lukewarm water, hydrogen peroxide.  Small pieces of " brown cerumen extracted with ear curette.  Bilateral TM: WNL  Patient tolerated procedure well.             Please note that this dictation was created using voice recognition software. I have made a reasonable attempt to correct obvious errors, but I expect that there are errors of grammar and possibly content that I did not discover before finalizing the note.    This note was electronically signed by SARAHI Sadler

## 2023-11-29 ENCOUNTER — PATIENT MESSAGE (OUTPATIENT)
Dept: HEALTH INFORMATION MANAGEMENT | Facility: OTHER | Age: 78
End: 2023-11-29

## 2024-06-20 ENCOUNTER — OFFICE VISIT (OUTPATIENT)
Dept: URGENT CARE | Facility: CLINIC | Age: 79
End: 2024-06-20
Payer: MEDICARE

## 2024-06-20 VITALS
HEART RATE: 63 BPM | DIASTOLIC BLOOD PRESSURE: 62 MMHG | RESPIRATION RATE: 18 BRPM | SYSTOLIC BLOOD PRESSURE: 124 MMHG | OXYGEN SATURATION: 96 % | BODY MASS INDEX: 26.21 KG/M2 | HEIGHT: 59 IN | WEIGHT: 130 LBS | TEMPERATURE: 97.9 F

## 2024-06-20 DIAGNOSIS — H61.22 IMPACTED CERUMEN OF LEFT EAR: ICD-10-CM

## 2024-06-20 DIAGNOSIS — H92.01 OTALGIA OF RIGHT EAR: ICD-10-CM

## 2024-06-20 DIAGNOSIS — H61.21 IMPACTED CERUMEN OF RIGHT EAR: ICD-10-CM

## 2024-06-20 PROCEDURE — 99213 OFFICE O/P EST LOW 20 MIN: CPT | Mod: 25 | Performed by: PHYSICIAN ASSISTANT

## 2024-06-20 PROCEDURE — 3074F SYST BP LT 130 MM HG: CPT | Performed by: PHYSICIAN ASSISTANT

## 2024-06-20 PROCEDURE — 3078F DIAST BP <80 MM HG: CPT | Performed by: PHYSICIAN ASSISTANT

## 2024-06-20 PROCEDURE — 69210 REMOVE IMPACTED EAR WAX UNI: CPT | Performed by: PHYSICIAN ASSISTANT

## 2024-06-20 NOTE — PROGRESS NOTES
Subjective     Rocio Shaikh is a 78 y.o. female who presents with Otalgia (Right ear  )    PMH:  has a past medical history of Thyroid disease.  MEDS:   Current Outpatient Medications:     thyroid (ARMOUR THYROID) 30 MG Tab, Take 30 mg by mouth every day., Disp: , Rfl:     Omega-3 Fatty Acids (FISH OIL) 1000 MG Cap capsule, Take 1,000 mg by mouth 3 times a day, with meals., Disp: , Rfl:     DHEA 25 MG Tab, Take 25 mg by mouth every day., Disp: , Rfl:     magnesium gluconate (MAG-G) 500 MG tablet, Take 500 mg by mouth 3 times a day., Disp: , Rfl:     Multiple Vitamins-Minerals (OCUVITE-LUTEIN) Tab, Take 1 tablet by mouth every day., Disp: , Rfl:     rosuvastatin (CRESTOR) 5 MG Tab, Take 5 mg by mouth every evening., Disp: , Rfl:     Multiple Vitamins-Minerals (MULTIVITAMIN ADULT PO), Take  by mouth., Disp: , Rfl:     vitamin E (VITAMIN E) 1000 UNIT Cap, Take 1 Cap by mouth every day., Disp: , Rfl:     vitamin D (CHOLECALCIFEROL) 1000 UNIT Tab, Take 1,000 Units by mouth every day., Disp: , Rfl:     VITAMIN K PO, Take  by mouth., Disp: , Rfl:   ALLERGIES:   Allergies   Allergen Reactions    Percocet [Oxycodone-Acetaminophen] Vomiting     SURGHX: History reviewed. No pertinent surgical history.  SOCHX:  reports that she has never smoked. She has never used smokeless tobacco. She reports current alcohol use of about 0.6 oz of alcohol per week. She reports that she does not use drugs.  FH: Reviewed with patient, not pertinent to this visit.           Patient presents with:  Otalgia: Right ear discomfort, patient has a history of cerumen impaction this year.  She put some softening drops in the ear last night but was unable to get the wax out on her own so she came in for evaluation.  Patient states her left ear is not bothering her at all.  Patient wears hearing aids.  No other complaints.        Otalgia   There is pain in the right ear. This is a new problem. The current episode started in the past 7 days. The problem  "occurs constantly. The problem has been gradually worsening. There has been no fever. Pertinent negatives include no hearing loss. She has tried ear drops for the symptoms. The treatment provided no relief. There is no history of a chronic ear infection.       Review of Systems   HENT:  Positive for ear pain. Negative for hearing loss.    All other systems reviewed and are negative.             Objective     /62   Pulse 63   Temp 36.6 °C (97.9 °F) (Temporal)   Resp 18   Ht 1.499 m (4' 11\")   Wt 59 kg (130 lb)   SpO2 96%   BMI 26.26 kg/m²      Physical Exam  Vitals and nursing note reviewed.   Constitutional:       General: She is not in acute distress.     Appearance: Normal appearance. She is well-developed. She is not ill-appearing.   HENT:      Head: Normocephalic and atraumatic.      Right Ear: There is impacted cerumen.      Left Ear: Tympanic membrane, ear canal and external ear normal.      Nose: Nose normal.      Mouth/Throat:      Mouth: Mucous membranes are moist.      Pharynx: Uvula midline.   Eyes:      Extraocular Movements: Extraocular movements intact.      Conjunctiva/sclera: Conjunctivae normal.      Pupils: Pupils are equal, round, and reactive to light.   Cardiovascular:      Rate and Rhythm: Normal rate.   Pulmonary:      Effort: Pulmonary effort is normal.   Musculoskeletal:         General: Normal range of motion.      Cervical back: Normal range of motion and neck supple.   Skin:     General: Skin is warm and dry.      Capillary Refill: Capillary refill takes less than 2 seconds.   Neurological:      Mental Status: She is alert and oriented to person, place, and time.      Gait: Gait normal.   Psychiatric:         Mood and Affect: Mood normal.                             Assessment & Plan              1. Impacted cerumen of right ear        2. Otalgia of right ear          Procedure: Cerumen Removal  Risks and benefits of procedure discussed  Cerumen removed with curette and " lavage after softening agent instilled  Patient tolerated well  Post procedure exam with clear canal and normal TM    Patient can take some over-the-counter ibuprofen 600 mg 3 times daily as needed for any residual pain.    Differential diagnosis, supportive care, and indications for immediate follow-up discussed with patient.  Instructed to return to clinic or nearest emergency department for any change in condition, further concerns, or worsening of symptoms.    I personally reviewed prior external notes and test results pertinent to today's visit.  I have independently reviewed and interpreted all diagnostics ordered during this urgent care visit.    PT should follow up with PCP in 1-2 days for re-evaluation if symptoms have not improved.      Discussed red flags and reasons to return to UC or ED.      Pt and/or family verbalized understanding of diagnosis and follow up instructions and was offered informational handout on diagnosis.  PT discharged.     Please note that this dictation was created using voice recognition software. I have made every reasonable attempt to correct obvious errors, but I expect that there may be errors of grammar and possibly content that I did not discover before finalizing the note.

## 2024-10-16 ENCOUNTER — OFFICE VISIT (OUTPATIENT)
Dept: URGENT CARE | Facility: CLINIC | Age: 79
End: 2024-10-16
Payer: MEDICARE

## 2024-10-16 VITALS
SYSTOLIC BLOOD PRESSURE: 122 MMHG | BODY MASS INDEX: 26.25 KG/M2 | DIASTOLIC BLOOD PRESSURE: 62 MMHG | HEART RATE: 75 BPM | TEMPERATURE: 97.2 F | WEIGHT: 130.2 LBS | RESPIRATION RATE: 14 BRPM | HEIGHT: 59 IN | OXYGEN SATURATION: 95 %

## 2024-10-16 DIAGNOSIS — H61.21 IMPACTED CERUMEN OF RIGHT EAR: ICD-10-CM

## 2024-10-16 PROCEDURE — 99213 OFFICE O/P EST LOW 20 MIN: CPT | Performed by: PHYSICIAN ASSISTANT

## 2024-10-16 PROCEDURE — 3078F DIAST BP <80 MM HG: CPT | Performed by: PHYSICIAN ASSISTANT

## 2024-10-16 PROCEDURE — 3074F SYST BP LT 130 MM HG: CPT | Performed by: PHYSICIAN ASSISTANT

## 2024-10-16 ASSESSMENT — ENCOUNTER SYMPTOMS
FEVER: 0
HEADACHES: 0
VOMITING: 0
DIZZINESS: 0
NAUSEA: 0
WEAKNESS: 0
CHILLS: 0

## 2024-12-12 ENCOUNTER — OFFICE VISIT (OUTPATIENT)
Dept: URGENT CARE | Facility: CLINIC | Age: 79
End: 2024-12-12
Payer: MEDICARE

## 2024-12-12 VITALS
WEIGHT: 130 LBS | DIASTOLIC BLOOD PRESSURE: 78 MMHG | RESPIRATION RATE: 16 BRPM | HEIGHT: 59 IN | OXYGEN SATURATION: 94 % | SYSTOLIC BLOOD PRESSURE: 124 MMHG | BODY MASS INDEX: 26.21 KG/M2 | TEMPERATURE: 97.8 F | HEART RATE: 78 BPM

## 2024-12-12 DIAGNOSIS — J06.9 VIRAL URI WITH COUGH: ICD-10-CM

## 2024-12-12 PROCEDURE — 3078F DIAST BP <80 MM HG: CPT | Performed by: REGISTERED NURSE

## 2024-12-12 PROCEDURE — 3074F SYST BP LT 130 MM HG: CPT | Performed by: REGISTERED NURSE

## 2024-12-12 PROCEDURE — 99213 OFFICE O/P EST LOW 20 MIN: CPT | Performed by: REGISTERED NURSE

## 2024-12-12 RX ORDER — BENZONATATE 100 MG/1
100 CAPSULE ORAL 3 TIMES DAILY PRN
Qty: 30 CAPSULE | Refills: 0 | Status: SHIPPED | OUTPATIENT
Start: 2024-12-12 | End: 2024-12-22

## 2024-12-12 ASSESSMENT — ENCOUNTER SYMPTOMS
DIZZINESS: 0
SHORTNESS OF BREATH: 0
ABDOMINAL PAIN: 0

## 2024-12-12 NOTE — PROGRESS NOTES
Subjective:   Rocio Shaikh is a 79 y.o. female who presents for Cough (Excessive coughing, headache, slight fever x 2 days )      HPI  2 days of flu like symptoms, tmax 101. Was exposed to someone at a party with similar symptoms. Using motrin and honey and gargling salt water. No chronic lung issues. Tolerating PO.     Review of Systems   Respiratory:  Negative for shortness of breath.    Cardiovascular:  Negative for chest pain.   Gastrointestinal:  Negative for abdominal pain.   Skin:  Negative for rash.   Neurological:  Negative for dizziness.       Allergies   Allergen Reactions    Percocet [Oxycodone-Acetaminophen] Vomiting       There are no active problems to display for this patient.      Current Outpatient Medications Ordered in Epic   Medication Sig Dispense Refill    benzonatate (TESSALON) 100 MG Cap Take 1 Capsule by mouth 3 times a day as needed for Cough for up to 10 days. 30 Capsule 0    thyroid (ARMOUR THYROID) 30 MG Tab Take 30 mg by mouth every day.      Omega-3 Fatty Acids (FISH OIL) 1000 MG Cap capsule Take 1,000 mg by mouth 3 times a day, with meals.      DHEA 25 MG Tab Take 25 mg by mouth every day.      magnesium gluconate (MAG-G) 500 MG tablet Take 500 mg by mouth 3 times a day.      Multiple Vitamins-Minerals (OCUVITE-LUTEIN) Tab Take 1 tablet by mouth every day.      rosuvastatin (CRESTOR) 5 MG Tab Take 5 mg by mouth every evening.      Multiple Vitamins-Minerals (MULTIVITAMIN ADULT PO) Take  by mouth.      vitamin E (VITAMIN E) 1000 UNIT Cap Take 1 Cap by mouth every day.      vitamin D (CHOLECALCIFEROL) 1000 UNIT Tab Take 1,000 Units by mouth every day.      VITAMIN K PO Take  by mouth.       No current Epic-ordered facility-administered medications on file.       No past surgical history on file.    Social History     Tobacco Use    Smoking status: Never    Smokeless tobacco: Never   Vaping Use    Vaping status: Never Used   Substance Use Topics    Alcohol use: Yes     Alcohol/week:  "0.6 oz     Types: 1 Glasses of wine per week     Comment: rare    Drug use: No       family history is not on file.     Problem list, medications, and allergies reviewed by myself today in Epic.     Objective:   /78   Pulse 78   Temp 36.6 °C (97.8 °F)   Resp 16   Ht 1.499 m (4' 11\")   Wt 59 kg (130 lb)   SpO2 94%   BMI 26.26 kg/m²     Physical Exam  Vitals and nursing note reviewed.   Constitutional:       General: She is not in acute distress.     Appearance: Normal appearance. She is well-developed. She is not ill-appearing, toxic-appearing or diaphoretic.   HENT:      Head: Normocephalic and atraumatic.      Right Ear: Tympanic membrane, ear canal and external ear normal.      Left Ear: Tympanic membrane, ear canal and external ear normal.      Nose: Congestion and rhinorrhea present.      Mouth/Throat:      Mouth: Mucous membranes are moist.      Pharynx: No oropharyngeal exudate or posterior oropharyngeal erythema.   Eyes:      General:         Right eye: No discharge.         Left eye: No discharge.      Conjunctiva/sclera: Conjunctivae normal.   Cardiovascular:      Rate and Rhythm: Normal rate and regular rhythm.      Pulses: Normal pulses.      Heart sounds: Normal heart sounds.   Pulmonary:      Effort: Pulmonary effort is normal. No respiratory distress.      Breath sounds: Normal breath sounds. No wheezing, rhonchi or rales.   Musculoskeletal:      Cervical back: Normal range of motion and neck supple.      Right lower leg: No edema.      Left lower leg: No edema.   Lymphadenopathy:      Cervical: No cervical adenopathy.   Skin:     General: Skin is warm and dry.   Neurological:      General: No focal deficit present.      Mental Status: She is alert and oriented to person, place, and time. Mental status is at baseline.   Psychiatric:         Mood and Affect: Mood normal.         Behavior: Behavior normal.         Thought Content: Thought content normal.         Judgment: Judgment normal. "         Assessment/Plan:     I personally reviewed prior external notes and test results pertinent to today's visit as well as additional imaging and testing completed in clinic today.    I introduced myself as Jose Juan Zambrano a Nurse Practitioner.    1. Viral URI with cough  benzonatate (TESSALON) 100 MG Cap        TESTING: Declined  VITAL SIGNS: WNL  MDM/PLAN: No signs of distress.  Talking full sentences.  Does have congestion, rhinorrhea and postnasal drainage.  No adventitious lung sounds.  No rash or nuchal rigidity.  Overall, unremarkable exam w/o red flag signs or symptoms.  We did discuss likely viral etiology.  No evidence of bacterial infection at this time.    Benzonatate for cough  OTC cold medication  Pulmonary toilet  Recommend adequate hydration   Rest  Return precautions discussed  . Medication discussed included indication for use and the potential benefits and side effects. The Patient was encouraged to monitor symptoms closely, and we reviewed the signs and symptoms that require a higher level of care through the emergency department. Patient verbalized understanding.    Please note that this dictation was created using voice recognition software. I have made every reasonable attempt to correct obvious errors, but I expect that there are errors of grammar and possibly content that I did not discover before finalizing the note.    This note was electronically signed by EDA Good

## 2025-03-08 ENCOUNTER — OFFICE VISIT (OUTPATIENT)
Dept: URGENT CARE | Facility: CLINIC | Age: 80
End: 2025-03-08
Payer: MEDICARE

## 2025-03-08 VITALS
TEMPERATURE: 97.1 F | DIASTOLIC BLOOD PRESSURE: 68 MMHG | OXYGEN SATURATION: 95 % | SYSTOLIC BLOOD PRESSURE: 102 MMHG | HEIGHT: 59 IN | BODY MASS INDEX: 25.8 KG/M2 | RESPIRATION RATE: 14 BRPM | WEIGHT: 128 LBS | HEART RATE: 60 BPM

## 2025-03-08 DIAGNOSIS — H61.21 IMPACTED CERUMEN OF RIGHT EAR: ICD-10-CM

## 2025-03-08 PROCEDURE — 3078F DIAST BP <80 MM HG: CPT

## 2025-03-08 PROCEDURE — 3074F SYST BP LT 130 MM HG: CPT

## 2025-03-08 PROCEDURE — 69210 REMOVE IMPACTED EAR WAX UNI: CPT

## 2025-03-08 NOTE — PROGRESS NOTES
"Subjective:   Rocio Shaikh is a 79 y.o. female who presents for Cerumen Impaction (R ear )      HPI  Patient presents with complaints of right ear cerumen impaction.  She does wear hearing aids.  She was instructed at [facility] to have her ears flushed.    Negative: hx of diabetes, hx of immunodeficiency, ear drainage, blood from ear, trauma to head, rash behind ear, postauricular swelling/tenderness/erythema, auricular fluctuance/deformity        ROS    Medical History:  Past Medical History:   Diagnosis Date    Thyroid disease        Allergies:  Allergies   Allergen Reactions    Percocet [Oxycodone-Acetaminophen] Vomiting       Social history, surgical history, medications, and current problem list reviewed today in Epic.       Objective:     /68 (BP Location: Left arm, Patient Position: Sitting, BP Cuff Size: Adult)   Pulse 60   Temp 36.2 °C (97.1 °F) (Temporal)   Resp 14   Ht 1.499 m (4' 11\")   Wt 58.1 kg (128 lb)   SpO2 95%     Physical Exam  Vitals reviewed.   Constitutional:       General: She is not in acute distress.     Appearance: Normal appearance. She is not ill-appearing, toxic-appearing or diaphoretic.   HENT:      Right Ear: There is impacted cerumen.      Left Ear: Tympanic membrane, ear canal and external ear normal.   Cardiovascular:      Rate and Rhythm: Normal rate.      Pulses: Normal pulses.   Pulmonary:      Effort: Pulmonary effort is normal.   Musculoskeletal:         General: Normal range of motion.      Cervical back: Normal range of motion.   Skin:     General: Skin is warm.      Capillary Refill: Capillary refill takes less than 2 seconds.   Neurological:      General: No focal deficit present.      Mental Status: She is alert and oriented to person, place, and time.   Psychiatric:         Mood and Affect: Mood normal.         Behavior: Behavior normal.       Procedure - Ear Lavage:  1. Informed and verbal consent received from the patient before the procedure.    2. " Discussed risks and benefits of doing the ear cleaning with the patient, patient gives full consent to get the their ear cleaned and agrees to the procedure.   3. Soaking with docusate for 10 minutes was not needed and then used lighted curette and tweezers to remove wax, patient tolerated the procedure well, and we were able to remove all impacted cerumen and could now see the tympanic membrane clear, patient states they can hear much better now.    4. Patient to go to the ER should they have any ear pain, discharge, or bleeding. Go to ED for any new or worsening symptoms.      Assessment/Plan:       Diagnosis and associated orders:     1. Impacted cerumen of right ear  - Ear Cerumen Removal     Comments/MDM:   I personally reviewed prior external notes and test results pertinent to today's visit as well as additional imaging and testing completed in clinic today.     Very pleasant 79-year-old female presenting with complaints of impacted cerumen right ear.  See procedure note above.  Patient tolerated procedure well.  Patient is clinically stable at today's acute urgent care visit. Vital signs are normal and reassuring.  No acute distress noted. Appropriate for outpatient management at this time. No red flag warnings noted.  Patient given strict instructions to follow up with emergency room if they develop any red flag warnings which were discussed in depth.  They verbalized understanding.  Differential diagnosis, natural history, supportive care, and indications for immediate follow-up discussed. All questions answered. They agree with the plan of care.      Please note that this dictation was created using voice recognition software. I have made every reasonable attempt to correct obvious errors, but I expect that there are errors of grammar and possibly content that I did not discover before finalizing the note.